# Patient Record
(demographics unavailable — no encounter records)

---

## 2017-10-17 NOTE — EKG
Test Reason : 

Blood Pressure : ***/*** mmHG

Vent. Rate : 073 BPM     Atrial Rate : 073 BPM

   P-R Int : 128 ms          QRS Dur : 082 ms

    QT Int : 398 ms       P-R-T Axes : 067 058 068 degrees

   QTc Int : 438 ms

 

Normal sinus rhythm

RSR' or QR pattern in V1 suggests right ventricular conduction delay

Borderline ECG

No previous ECGs available

Confirmed by ALBARO VERDUZCO ([id number]) on 10/17/2017 2:51:37 PM

 

Referred By:  THERESE           Confirmed By:ALBARO VERDUZCO

## 2017-10-17 NOTE — HP
HISTORY OF PRESENT ILLNESS:  Ms. Junior is known to us from previous evaluation of low back complaints
 and carpal tunnel release, who presents after having 2-3 months of left SI like pain and also bilat
eral lower extremity L5 radiculopathy.  She feels it is worse with sitting.  She has had a mid thora
cic pain as well, which has been helped by massage.  She had an MRI from [facility], which reveals p
rofound degenerative disk disease with Modic endplate changes at L4-L5 as well as lateral recess erika
nosis bilaterally there, contacting the traversing L5 nerve root on each side, and I think this is v
adrianne well the symptoms that she has been experiencing.

 

PAST MEDICAL HISTORY:  Breast cancer, rheumatoid arthritis, hypertension and asthma.

 

ALLERGIES:  PENICILLIN and LATEX.

 

PAST SURGICAL HISTORY:  Lumbar laminectomy and carpal tunnel release, mastectomy and hysterectomy.

 

CURRENT MEDICATIONS:  Tamoxifen, losartan, Plaquenil, Singulair, Zyrtec, Prevacid and Symbicort.

 

PHYSICAL EXAMINATION:  Patient is alert and oriented x3.  Gait is normal.  No ataxia.  Lower extremi
ty motor exam is normal.  Left straight leg raise is positive.  Right is not.

 

ASSESSMENT:  Lumbar radicular pain and back pain.

 

PLAN:  Dr. Adhikari met with the patient, reviewed imaging and ultimately advocated for an L4-L5 fusion
.  He explained to the patient the risks, benefits and alternatives of the procedure.  The patient e
xpressed understanding and would like to move forward with the surgery as discussed.  I do believe t
he patient is mentally competent and capable of making medical decisions for herself and we will mov
e forward with surgery as planned.

## 2017-10-18 NOTE — OP
DATE OF PROCEDURE:  10/18/2017

 

SURGEON:  Danilo Adhikari M.D.

 

FIRST ASSISTANT:  Jam Corrales PA-C

 

INDICATION:  Pain and prevent neurologic decline.

 

DIAGNOSIS:  Advanced degenerative disk disease with associated lumbar radiculopathy.

 

PROCEDURES:  Reoperation left L4-5 hemilaminectomy, facetectomy, placement of transforaminal lumbar 
interbody device, placement of posterolateral instrumented fusion, placement of allograft, and place
ment of autograft.

 

TECHNIQUE:  The patient was brought into the operating room and placed under general anesthesia.  Sh
e was flipped from a supine to a prone position on the operating room table.  A linear incision was 
planned over the L4-5 segment.  This represented impart a previous incision site.  After prepping an
d draping and after an appropriate operative pause, the incision was created.  The soft tissues were
 swept left of midline.  After confirming the appropriate location, the facet joint at L4-L5 was rem
shaka.  The pedicles of L4 and L5 were palpated as was the exiting L4 and descending L5 nerve roots. 
 The disk space was identified and a diskectomy was performed.  Disk material as well as the posteri
or osteophytes was removed.  An 8 mm PEEK cage packed with allograft and autograft material was plac
ed into the interbody space.  Pedicle screws were then placed in the pedicles of L4 and L5 on the le
ft side with the aide of C-arm fluoroscopy.  The position was confirmed using AP and lateral fluoros
copy.  A marilu was then placed across the screw heads, which were then tightened under a slight degree
 of compression.  Allograft and autograft material was placed in the lateral confines of the instrum
entation construct.  The procedure came to an end without complication.

## 2017-10-18 NOTE — PRG
DATE OF SERVICE:  10/18/2017

 

Mr. Junior is a 61-year-old female known to us from prior lumbar spine procedures.  She was most recen
tly evaluated in the outpatient setting by Jam Corrales for predominantly low back and left leg p
ain.  I followed up encounter with a phone call where she and I discussed the surgical procedure katie ramsey would be a decompression and lumbar fusion.  I met with her again this morning to discuss her solomon
ging and plan surgical procedure as well and answered her questions.  She has a very small L4 pedicl
e on the right side, so I did discuss with her potentially moving forward with unilateral construct 
and potentially placement of interbody device.  I did review all risks, benefits, and alternatives y
et again.  She offered and informed consent.